# Patient Record
Sex: FEMALE | Race: WHITE | URBAN - METROPOLITAN AREA
[De-identification: names, ages, dates, MRNs, and addresses within clinical notes are randomized per-mention and may not be internally consistent; named-entity substitution may affect disease eponyms.]

---

## 2023-03-30 ENCOUNTER — WEB ENCOUNTER (OUTPATIENT)
Dept: URBAN - METROPOLITAN AREA CLINIC 19 | Facility: CLINIC | Age: 73
End: 2023-03-30

## 2023-03-30 ENCOUNTER — DASHBOARD ENCOUNTERS (OUTPATIENT)
Age: 73
End: 2023-03-30

## 2023-03-30 ENCOUNTER — LAB OUTSIDE AN ENCOUNTER (OUTPATIENT)
Dept: URBAN - METROPOLITAN AREA CLINIC 19 | Facility: CLINIC | Age: 73
End: 2023-03-30

## 2023-03-30 ENCOUNTER — OFFICE VISIT (OUTPATIENT)
Dept: URBAN - METROPOLITAN AREA CLINIC 19 | Facility: CLINIC | Age: 73
End: 2023-03-30
Payer: MEDICARE

## 2023-03-30 VITALS
TEMPERATURE: 98 F | BODY MASS INDEX: 32.34 KG/M2 | DIASTOLIC BLOOD PRESSURE: 64 MMHG | HEIGHT: 59 IN | SYSTOLIC BLOOD PRESSURE: 102 MMHG | HEART RATE: 80 BPM | OXYGEN SATURATION: 97 % | WEIGHT: 160.4 LBS

## 2023-03-30 DIAGNOSIS — R11.11 VOMITING WITHOUT NAUSEA, UNSPECIFIED VOMITING TYPE: ICD-10-CM

## 2023-03-30 DIAGNOSIS — Z12.11 SCREEN FOR COLON CANCER: ICD-10-CM

## 2023-03-30 PROCEDURE — 99204 OFFICE O/P NEW MOD 45 MIN: CPT | Performed by: NURSE PRACTITIONER

## 2023-03-30 RX ORDER — POLYETHYLENE GLYCOL 3350, SODIUM CHLORIDE, SODIUM BICARBONATE, POTASSIUM CHLORIDE 420; 11.2; 5.72; 1.48 G/4L; G/4L; G/4L; G/4L
4,000ML POWDER, FOR SOLUTION ORAL
Qty: 1 UNSPECIFIED | Refills: 0 | OUTPATIENT
Start: 2023-03-30 | End: 2023-03-31

## 2023-03-30 NOTE — HPI-TODAY'S VISIT:
Ms. Up is a 74 yo female with PMH of CKD, DM2 who presents today for screening colonoscopy. She has never had a colonoscopy.  She has had Cologuard testing last year or two and it was negative. No known family history as she is adopted. She has a daughter who is 33 and healthy.   She reports typically having normal regular solid BMS everyday but once about every 10 days will have a mushy stool. No bloody or black stools. No abdominal or rectal pain.   Reports vomiting without nausea and also seems to occur about once every 10 days. Reports a lot of belching. No heartburn/reflux. Denies any trouble swallowing. Appetite good, no unintentional weight loss.  She is not on any blood thinners. No NSAIDs or aspirin. No pacemaker or cardiac stents. No pulmonary disease. No dialysis.

## 2023-05-17 ENCOUNTER — TELEPHONE ENCOUNTER (OUTPATIENT)
Dept: URBAN - METROPOLITAN AREA CLINIC 19 | Facility: CLINIC | Age: 73
End: 2023-05-17

## 2023-05-23 ENCOUNTER — OFFICE VISIT (OUTPATIENT)
Dept: URBAN - METROPOLITAN AREA MEDICAL CENTER 25 | Facility: MEDICAL CENTER | Age: 73
End: 2023-05-23
Payer: MEDICARE

## 2023-05-23 ENCOUNTER — LAB OUTSIDE AN ENCOUNTER (OUTPATIENT)
Dept: URBAN - METROPOLITAN AREA CLINIC 19 | Facility: CLINIC | Age: 73
End: 2023-05-23

## 2023-05-23 ENCOUNTER — OFFICE VISIT (OUTPATIENT)
Dept: URBAN - METROPOLITAN AREA MEDICAL CENTER 25 | Facility: MEDICAL CENTER | Age: 73
End: 2023-05-23

## 2023-05-23 DIAGNOSIS — Z12.11 COLON CANCER SCREENING: ICD-10-CM

## 2023-05-23 DIAGNOSIS — D12.2 ADENOMA OF ASCENDING COLON: ICD-10-CM

## 2023-05-23 LAB
GLUCOSE POC: 100
PERFORMING LAB: (no result)

## 2023-05-23 PROCEDURE — 45385 COLONOSCOPY W/LESION REMOVAL: CPT | Performed by: INTERNAL MEDICINE

## 2023-05-26 ENCOUNTER — TELEPHONE ENCOUNTER (OUTPATIENT)
Dept: URBAN - METROPOLITAN AREA CLINIC 19 | Facility: CLINIC | Age: 73
End: 2023-05-26